# Patient Record
Sex: MALE | URBAN - METROPOLITAN AREA
[De-identification: names, ages, dates, MRNs, and addresses within clinical notes are randomized per-mention and may not be internally consistent; named-entity substitution may affect disease eponyms.]

---

## 2021-09-14 ENCOUNTER — OFFICE VISIT (OUTPATIENT)
Dept: URBAN - METROPOLITAN AREA CLINIC 3 | Facility: CLINIC | Age: 61
End: 2021-09-14
Payer: COMMERCIAL

## 2021-09-14 DIAGNOSIS — Z96.1 PRESENCE OF INTRAOCULAR LENS: ICD-10-CM

## 2021-09-14 DIAGNOSIS — H18.612 KERATOCONUS, STABLE, LEFT EYE: ICD-10-CM

## 2021-09-14 DIAGNOSIS — E11.9 DIABETES MELLITUS TYPE 2 WITHOUT MENTION OF COMPLICATION: Primary | ICD-10-CM

## 2021-09-14 DIAGNOSIS — H25.042 POSTERIOR SUBCAPSULAR POLAR AGE RELATED CATARACT, LEFT EYE: ICD-10-CM

## 2021-09-14 PROCEDURE — 92014 COMPRE OPH EXAM EST PT 1/>: CPT | Performed by: OPTOMETRIST

## 2021-09-14 RX ORDER — TORSEMIDE 10 MG/1
10 MG TABLET ORAL
Refills: 0 | Status: ACTIVE
Start: 2021-09-14

## 2021-09-14 RX ORDER — APIXABAN 5 MG/1
5 MG TABLET, FILM COATED ORAL
Refills: 0 | Status: ACTIVE
Start: 2021-09-14

## 2021-09-14 ASSESSMENT — INTRAOCULAR PRESSURE
OD: 9
OS: 9

## 2021-09-14 NOTE — IMPRESSION/PLAN
Impression: Presence of intraocular lens: Z96.1. Plan: Advised of signs/symptoms/risk of PCO development and to RTC if noted. Otherwise RTC 1 year.

## 2021-09-14 NOTE — IMPRESSION/PLAN
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 Plan: Patient has visually significant cataracts. However due to underlying pathology/condition will defer at this time.

## 2021-09-14 NOTE — IMPRESSION/PLAN
Impression: Diabetes mellitus Type 2 without mention of complication: Y92.8. 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 Discussed that DM and diabetic retinopathy are the leading cause of preventable vision loss in [place] adults. Stressed the need for proper blood sugar control and correlation of A1c and diabetic eye disease. Patient referred to retina specialist for further evaluation and possible intravitreal injections.

## 2021-09-14 NOTE — IMPRESSION/PLAN
Impression: Keratoconus, stable, left eye: H18.612. Plan: Advised patient that cataract surgery will improve vision but given KCN outcome will be reduced comparatively.

## 2022-10-31 ENCOUNTER — OFFICE VISIT (OUTPATIENT)
Dept: URBAN - METROPOLITAN AREA CLINIC 3 | Facility: CLINIC | Age: 62
End: 2022-10-31
Payer: COMMERCIAL

## 2022-10-31 DIAGNOSIS — Z96.1 PRESENCE OF INTRAOCULAR LENS: ICD-10-CM

## 2022-10-31 DIAGNOSIS — H18.612 KERATOCONUS, STABLE, LEFT EYE: ICD-10-CM

## 2022-10-31 DIAGNOSIS — E11.9 DIABETES MELLITUS TYPE 2 WITHOUT MENTION OF COMPLICATION: Primary | ICD-10-CM

## 2022-10-31 DIAGNOSIS — H25.042 POSTERIOR SUBCAPSULAR POLAR AGE RELATED CATARACT, LEFT EYE: ICD-10-CM

## 2022-10-31 PROCEDURE — 99214 OFFICE O/P EST MOD 30 MIN: CPT | Performed by: OPTOMETRIST

## 2022-10-31 RX ORDER — APIXABAN 5 MG/1
5 MG TABLET, FILM COATED ORAL
Refills: 0 | Status: ACTIVE
Start: 2022-10-31

## 2022-10-31 ASSESSMENT — INTRAOCULAR PRESSURE
OD: 15
OS: 13

## 2022-10-31 NOTE — IMPRESSION/PLAN
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 Plan: Observe for now without intervention. The patient was advised to contact us if any change or worsening of vision.

## 2022-10-31 NOTE — IMPRESSION/PLAN
Impression: Diabetes mellitus Type 2 without mention of complication: Y49.2. Plan: Discussed the pathophysiology of diabetes and its effect on the right eye. Stressed the importance of strong glucose control. Advised of importance of scheduled dilated examinations, and to contact us immediately for any problems or concerns.